# Patient Record
Sex: FEMALE | Race: WHITE
[De-identification: names, ages, dates, MRNs, and addresses within clinical notes are randomized per-mention and may not be internally consistent; named-entity substitution may affect disease eponyms.]

---

## 2017-08-31 ENCOUNTER — HOSPITAL ENCOUNTER (EMERGENCY)
Dept: HOSPITAL 25 - ED | Age: 22
Discharge: HOME | End: 2017-08-31
Payer: COMMERCIAL

## 2017-08-31 ENCOUNTER — HOSPITAL ENCOUNTER (EMERGENCY)
Dept: HOSPITAL 25 - UCEAST | Age: 22
Discharge: HOME | End: 2017-08-31
Payer: COMMERCIAL

## 2017-08-31 VITALS — SYSTOLIC BLOOD PRESSURE: 99 MMHG | DIASTOLIC BLOOD PRESSURE: 63 MMHG

## 2017-08-31 VITALS — SYSTOLIC BLOOD PRESSURE: 98 MMHG | DIASTOLIC BLOOD PRESSURE: 54 MMHG

## 2017-08-31 DIAGNOSIS — F99: Primary | ICD-10-CM

## 2017-08-31 DIAGNOSIS — Z76.0: Primary | ICD-10-CM

## 2017-08-31 DIAGNOSIS — F17.210: ICD-10-CM

## 2017-08-31 PROCEDURE — 99282 EMERGENCY DEPT VISIT SF MDM: CPT

## 2017-08-31 PROCEDURE — G0463 HOSPITAL OUTPT CLINIC VISIT: HCPCS

## 2017-08-31 PROCEDURE — 99211 OFF/OP EST MAY X REQ PHY/QHP: CPT

## 2017-08-31 NOTE — ED
Psychiatric Complaint





- HPI Summary


HPI Summary: 





THe patient is a 22 year old female priorly under the care of [facility] 

where her medications were filled in the past.   The patient states she no 

longer follows with them  and has an appointment with [facility], however is unable to be seen by them until 9/11.   She will run out of 

her medication prior to this time and is requesting refills for 2 weeks.  SHe 

does have  a primary physician.  SHe denies HI/ SI and is not feeling depressed 

or manic.  She is traveling to [place] tomorrow.   





- History Of Current Complaint


Chief Complaint: EDPrescriptionNeeded


Time Seen by Provider: 08/31/17 20:21


Hx Obtained From: Patient


Hx Last Menstrual Period: 8/17/17


Severity Currently: None


Aggravating Factor(s): Nothing


Alleviating Factor(s): Nothing





- Allergies/Home Medications


Allergies/Adverse Reactions: 


 Allergies











Allergy/AdvReac Type Severity Reaction Status Date / Time


 


No Known Allergies Allergy   Verified 08/31/17 17:39














PMH/Surg Hx/FS Hx/Imm Hx


Previously Healthy: No - previous psych history 


Respiratory History: Reports: Hx Asthma





- Surgical History


Surgery Procedure, Year, and Place: HYMENECTOMY


Infectious Disease History: No


Infectious Disease History: 


   Denies: Traveled Outside the [place] in Last 30 Days





- Family History


Known Family History: Positive: None





- Social History


Alcohol Use: Weekly


Substance Use Type: Reports: Marijuana


Substance Use Comment - Amount & Last Used: none in a week


Smoking Status (MU): Light Every Day Tobacco Smoker


Amount Used/How Often: about 3 times a week





Review of Systems


All Other Systems Reviewed And Are Negative: Yes





Physical Exam


Triage Information Reviewed: Yes


Vital Signs On Initial Exam: 


 Initial Vitals











Temp Pulse Resp BP Pulse Ox


 


 97.7 F   61   14   104/64   100 


 


 08/31/17 19:19  08/31/17 19:19  08/31/17 19:19  08/31/17 19:19  08/31/17 19:19











Vital Signs Reviewed: Yes


Appearance: Positive: Well-Appearing, No Pain Distress, Well-Nourished


Skin: Positive: Warm, Skin Color Reflects Adequate Perfusion


Respiratory/Lung Sounds: Positive: Breath Sounds Present


Cardiovascular: Positive: Normal, RRR


Neurological: Positive: Normal, Sensory/Motor Intact, Alert, Oriented to Person 

Place, Time, CN Intact II-III, Normal Gait, Facial Symmetry, Speech Normal


Psychiatric: Positive: Normal





Diagnostics





- Vital Signs


 Vital Signs











  Temp Pulse Resp BP Pulse Ox


 


 08/31/17 21:15  97.6 F  52  16  98/54  100


 


 08/31/17 19:19  97.7 F  61  14  104/64  100














- Laboratory


Lab Statement: Any lab studies that have been ordered have been reviewed, and 

results considered in the medical decision making process.





Course/Dx





- Course


Course Of Treatment: Tx was discussed with patient due to patients travel plans 

medications will be called in for 1 week, however patient should be followed by 

primary or TC.





- Differential Dx/Clinical Impression


Provider Diagnosis: 


 Medication refill








Discharge





- Discharge Plan


Condition: Stable


Disposition: HOME


Prescriptions: 


Bupropion HCl [Wellbutrin Sr] 300 mg PO BEDTIME #7 dose


Propranolol TAB* [Inderal TAB*] 10 mg PO DAILY PRN #7 dose


 PRN Reason: Anxiety


Quetiapine Fumarate [Seroquel] 25 mg PO BEDTIME PRN #7 dose


 PRN Reason: Insomnia


Patient Education Materials:  Bupropion (By mouth), Quetiapine (By mouth), 

Propranolol (By mouth)


Referrals: 


Liz Washington MD [Primary Care Provider] - 


Additional Instructions: 


- Medication given x 7 days.  Please follow up with primary physician for 

medication refills.

## 2017-08-31 NOTE — UC
General HPI





- HPI Summary


HPI Summary: 





NEEDS REFILL OF SEROQUIL, PROPANOLOL, AND WELLBUTRIN. WAS COVERED WITH [facility], HAS BEEN DROPPED BY THEIR SERVICE. APPT WITH [facility] hospital IN SEPT. NO THOUGHTS 

OF SELF HARM OR OF HURTING OTHERS. 





- History of Current Complaint


Chief Complaint: UCMedRefill


Stated Complaint: MED REFILL


Time Seen by Provider: 08/31/17 18:08


Hx Obtained From: Patient


Hx Last Menstrual Period: 8/17/17


Onset/Duration: Still Present


Onset Severity: Mild


Current Severity: None


Pain Intensity: 0


Associated Signs & Symptoms: Negative: Agitation, Abdominal Pain, Cough, 

Dizziness, Diarrhea, Dysuria, Fever, Headache, Palpitations, Wheezing, Weakness





- Allergy/Home Medications


Allergies/Adverse Reactions: 


 Allergies











Allergy/AdvReac Type Severity Reaction Status Date / Time


 


No Known Allergies Allergy   Verified 08/31/17 17:39











Home Medications: 


 Home Medications





Bupropion HCl [Wellbutrin Sr] 300 mg PO BEDTIME 08/31/17 [History Confirmed 08/ 31/17]


Lithium Carbonate 150 mg PO BEDTIME 08/31/17 [History Confirmed 08/31/17]


Propranolol TAB* [Inderal TAB*] 10 mg PO DAILY PRN 08/31/17 [History Confirmed 

08/31/17]


Quetiapine Fumarate [Seroquel] 25 mg PO BEDTIME PRN 08/31/17 [History Confirmed 

08/31/17]











PMH/Surg Hx/FS Hx/Imm Hx


Previously Healthy: Yes





- Surgical History


Surgical History: Yes


Surgery Procedure, Year, and Place: HYMENECTOMY





- Family History


Known Family History: Positive: None





- Social History


Occupation: Student


Lives: With Family


Alcohol Use: Weekly


Substance Use Type: Marijuana


Substance Use Comment - Amount & Last Used: none in a week


Smoking Status (MU): Light Every Day Tobacco Smoker


Amount Used/How Often: about 3 times a week





Review of Systems


Constitutional: Negative


Skin: Negative


Eyes: Negative


ENT: Negative


Respiratory: Negative


Cardiovascular: Negative


Gastrointestinal: Negative


Genitourinary: Negative


Motor: Negative


Neurovascular: Negative


Musculoskeletal: Negative


Neurological: Negative


Psychological: Negative


All Other Systems Reviewed And Are Negative: Yes





Physical Exam


Triage Information Reviewed: Yes


Appearance: Well-Appearing, No Pain Distress, Well-Nourished


Vital Signs: 


 Initial Vital Signs











Temp  98.5 F   08/31/17 17:33


 


Pulse  68   08/31/17 17:33


 


Resp  16   08/31/17 17:33


 


BP  99/63   08/31/17 17:33


 


Pulse Ox  100   08/31/17 17:33











Vital Signs Reviewed: Yes


Eye Exam: Normal


ENT Exam: Normal


ENT: Positive: Normal ENT inspection, Hearing grossly normal, Pharynx normal, 

TMs normal


Dental Exam: Normal


Neck exam: Normal


Neck: Positive: Supple, Nontender, No Lymphadenopathy


Respiratory Exam: Normal


Respiratory: Positive: Chest non-tender, Lungs clear, Normal breath sounds, No 

respiratory distress, No accessory muscle use


Cardiovascular Exam: Normal


Cardiovascular: Positive: RRR, No Murmur, Pulses Normal


Abdominal Exam: Normal


Musculoskeletal Exam: Normal


Musculoskeletal: Positive: Strength Intact, ROM Intact


Neurological Exam: Normal


Psychological Exam: Normal


Skin Exam: Normal





Course/Dx





- Course


Course Of Treatment: PATIENT AGREED TO SEEK EVALUATION BY PRIMARY CARE, OR TO 

VISIT ED FOR PSYCHIATRIC CONSULTATION AND EVALUATION





- Differential Dx - Multi-Symptom


Differential Diagnoses: Metabolic Abnormality, Urinary Tract Infection


Provider Diagnoses: NORMAL EXAM;





Discharge





- Discharge Plan


Condition: Stable


Disposition: HOME


Referrals: 


Liz Washington MD [Primary Care Provider] - 


Additional Instructions: 


WE ARE UNABLE TO REFILL YOUR MEDICATIONS AT THIS TIME. PLEASE CALL YOUR PRIMARY 

CARE PROVIDER, AND/OR SEEK EVALUATION WITH THE EMERGENCY DEPARTMENT TO EVALUATE 

YOUR CONDITION AND TO RESPOND TO YOUR CONCERNS.

## 2017-09-20 NOTE — ED
Progress





- Progress Note


Progress Note: 





It appears pt was seen by me but chart is attributed to the [facility], so my 

documentation is complete





Course/Dx





- Course


Course Of Treatment: Tx was discussed with patient due to patients travel plans 

medications will be called in for 1 week, however patient should be followed by 

primary or TC.





- Diagnoses


Provider Diagnoses: 


 Medication refill

## 2019-06-22 ENCOUNTER — HOSPITAL ENCOUNTER (EMERGENCY)
Dept: HOSPITAL 25 - UCEAST | Age: 24
Discharge: HOME | End: 2019-06-22
Payer: COMMERCIAL

## 2019-06-22 VITALS — DIASTOLIC BLOOD PRESSURE: 65 MMHG | SYSTOLIC BLOOD PRESSURE: 103 MMHG

## 2019-06-22 DIAGNOSIS — K04.7: Primary | ICD-10-CM

## 2019-06-22 DIAGNOSIS — F17.210: ICD-10-CM

## 2019-06-22 PROCEDURE — 99212 OFFICE O/P EST SF 10 MIN: CPT

## 2019-06-22 PROCEDURE — G0463 HOSPITAL OUTPT CLINIC VISIT: HCPCS

## 2020-01-30 ENCOUNTER — HOSPITAL ENCOUNTER (INPATIENT)
Dept: HOSPITAL 25 - ED | Age: 25
LOS: 6 days | Discharge: HOME | DRG: 885 | End: 2020-02-05
Attending: PSYCHIATRY & NEUROLOGY | Admitting: PSYCHIATRY & NEUROLOGY
Payer: SELF-PAY

## 2020-01-30 DIAGNOSIS — F60.3: ICD-10-CM

## 2020-01-30 DIAGNOSIS — R45.851: ICD-10-CM

## 2020-01-30 DIAGNOSIS — Z79.899: ICD-10-CM

## 2020-01-30 DIAGNOSIS — Z91.19: ICD-10-CM

## 2020-01-30 DIAGNOSIS — J45.909: ICD-10-CM

## 2020-01-30 DIAGNOSIS — F41.9: ICD-10-CM

## 2020-01-30 DIAGNOSIS — F17.290: ICD-10-CM

## 2020-01-30 DIAGNOSIS — F31.60: Primary | ICD-10-CM

## 2020-01-30 LAB
ALBUMIN SERPL BCG-MCNC: 4.3 G/DL (ref 3.2–5.2)
ALBUMIN/GLOB SERPL: 1.5 {RATIO} (ref 1–3)
ALP SERPL-CCNC: 58 U/L (ref 34–104)
ALT SERPL W P-5'-P-CCNC: 33 U/L (ref 7–52)
ANION GAP SERPL CALC-SCNC: 7 MMOL/L (ref 2–11)
APAP SERPL-MCNC: < 15 MCG/ML
AST SERPL-CCNC: 31 U/L (ref 13–39)
BASOPHILS # BLD AUTO: 0.1 10^3/UL (ref 0–0.2)
BUN SERPL-MCNC: 12 MG/DL (ref 6–24)
BUN/CREAT SERPL: 17.6 (ref 8–20)
CALCIUM SERPL-MCNC: 9 MG/DL (ref 8.6–10.3)
CHLORIDE SERPL-SCNC: 104 MMOL/L (ref 101–111)
EOSINOPHIL # BLD AUTO: 0.1 10^3/UL (ref 0–0.6)
GLOBULIN SER CALC-MCNC: 2.9 G/DL (ref 2–4)
GLUCOSE SERPL-MCNC: 88 MG/DL (ref 70–100)
HCO3 SERPL-SCNC: 27 MMOL/L (ref 22–32)
HCT VFR BLD AUTO: 42 % (ref 35–47)
HGB BLD-MCNC: 14.8 G/DL (ref 12–16)
LYMPHOCYTES # BLD AUTO: 1.8 10^3/UL (ref 1–4.8)
MCH RBC QN AUTO: 33 PG (ref 27–31)
MCHC RBC AUTO-ENTMCNC: 35 G/DL (ref 31–36)
MCV RBC AUTO: 94 FL (ref 80–97)
MONOCYTES # BLD AUTO: 0.5 10^3/UL (ref 0–0.8)
NEUTROPHILS # BLD AUTO: 4.6 10^3/UL (ref 1.5–7.7)
NRBC # BLD AUTO: 0 10^3/UL
NRBC BLD QL AUTO: 0
PLATELET # BLD AUTO: 191 10^3/UL (ref 150–450)
POTASSIUM SERPL-SCNC: 3.7 MMOL/L (ref 3.5–5)
PROT SERPL-MCNC: 7.2 G/DL (ref 6.4–8.9)
RBC # BLD AUTO: 4.53 10^6 /UL (ref 3.7–4.87)
RBC UR QL AUTO: (no result)
SALICYLATES SERPL-MCNC: < 2.5 MG/DL (ref ?–30)
SODIUM SERPL-SCNC: 138 MMOL/L (ref 135–145)
TSH SERPL-ACNC: 0.81 MCIU/ML (ref 0.34–5.6)
WBC # BLD AUTO: 7.1 10^3/UL (ref 3.5–10.8)
WBC UR QL AUTO: (no result)

## 2020-01-30 PROCEDURE — 85025 COMPLETE CBC W/AUTO DIFF WBC: CPT

## 2020-01-30 PROCEDURE — 80053 COMPREHEN METABOLIC PANEL: CPT

## 2020-01-30 PROCEDURE — 99222 1ST HOSP IP/OBS MODERATE 55: CPT

## 2020-01-30 PROCEDURE — G0480 DRUG TEST DEF 1-7 CLASSES: HCPCS

## 2020-01-30 PROCEDURE — 84443 ASSAY THYROID STIM HORMONE: CPT

## 2020-01-30 PROCEDURE — 99238 HOSP IP/OBS DSCHRG MGMT 30/<: CPT

## 2020-01-30 PROCEDURE — 87086 URINE CULTURE/COLONY COUNT: CPT

## 2020-01-30 PROCEDURE — 99283 EMERGENCY DEPT VISIT LOW MDM: CPT

## 2020-01-30 PROCEDURE — 81003 URINALYSIS AUTO W/O SCOPE: CPT

## 2020-01-30 PROCEDURE — 83036 HEMOGLOBIN GLYCOSYLATED A1C: CPT

## 2020-01-30 PROCEDURE — 80307 DRUG TEST PRSMV CHEM ANLYZR: CPT

## 2020-01-30 PROCEDURE — 81015 MICROSCOPIC EXAM OF URINE: CPT

## 2020-01-30 PROCEDURE — 80320 DRUG SCREEN QUANTALCOHOLS: CPT

## 2020-01-30 PROCEDURE — 99232 SBSQ HOSP IP/OBS MODERATE 35: CPT

## 2020-01-30 PROCEDURE — 36415 COLL VENOUS BLD VENIPUNCTURE: CPT

## 2020-01-30 PROCEDURE — 90853 GROUP PSYCHOTHERAPY: CPT

## 2020-01-30 PROCEDURE — 80329 ANALGESICS NON-OPIOID 1 OR 2: CPT

## 2020-01-30 PROCEDURE — 84702 CHORIONIC GONADOTROPIN TEST: CPT

## 2020-01-30 PROCEDURE — 80061 LIPID PANEL: CPT

## 2020-01-31 LAB
CHOLEST SERPL-MCNC: 124 MG/DL
HDLC SERPL-MCNC: 28.8 MG/DL
TRIGL SERPL-MCNC: 74 MG/DL

## 2020-01-31 RX ADMIN — THERA TABS SCH: TAB at 08:53

## 2020-01-31 RX ADMIN — ARIPIPRAZOLE SCH MG: 5 TABLET ORAL at 21:12
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
## 2020-02-01 LAB — HCG SERPL QL: < 0.6 MIU/ML

## 2020-02-01 RX ADMIN — ARIPIPRAZOLE SCH MG: 5 TABLET ORAL at 21:52

## 2020-02-01 RX ADMIN — THERA TABS SCH: TAB at 12:25

## 2020-02-02 RX ADMIN — THERA TABS SCH: TAB at 07:55

## 2020-02-02 RX ADMIN — ARIPIPRAZOLE SCH MG: 5 TABLET ORAL at 21:12

## 2020-02-02 NOTE — PN
Subjective





- Subjective


Date of Service: 02/02/20


Service Type: 11295 Hosp care 25 min moderate complexity


Subjective: 





Amira appears to be calm, pleasant and cooperative. She understand the 

reasons for her relapse on symptoms and is willing to take meds again. She also 

requested to increase the dose of her meds so that she can go on long acting 

injectable before she leaves. Denies racing thoughts and SI. Says she slept 

well last night.





Objective





- General Observations


Appearance: Neat, Well Groomed


Appears Stated Age: Yes


Stature: Thin, Tall


Posture: WNL


Eye Contact: Average


Behavior/Activity: WNL





- Interaction Observations


Attitude Towards Examiner: Cooperative


Stated Mood: Euthymic


Affect: Full


Speech Pattern/Tone: Clear, Appropriate, Normal Volume


Perception: WNL


Thought Content: WNL


Hallucination Type: Denies


Delusion Type: Denies





- Cognitive Function


Orientation: A&O x 4


Level of Consciousness: Awake, Alert, Appropriate


Cognition: WNL


Estimated Intelligence: Normal


Insight: WNL


Judgment Within Normal Limits: Yes





- Medication Compliance


Cooperative with Inpatient Medication Regimen: Yes





- Group Participation


Participates in Group Activities: Yes





Assessment





- Assessment


Merits Inpatient Hospitalization: For Stabilization, Consolidate Improvements, 

For Discharge Planning





Plan





- Plan


Treatment Plan: 


Name: AMIRA KAPLAN                        


YOB: 1995                        


[id number]


[id number]











Continued Medication Management: Continue Outpt Medication


Medications: 


 Current Medications





Acetaminophen (Tylenol Tab*)  650 mg PO Q4H PRN


   PRN Reason: PAIN or TEMP > 101 F


Al Hydrox/Mg Hydrox/Simethicone (Maalox Plus*)  30 ml PO Q4H PRN


   PRN Reason: INDIGESTION


Aripiprazole (Abilify  Tab*)  10 mg PO BEDTIME BARBARA


Hydroxyzine HCl (Atarax Tab*)  50 mg PO Q6H PRN


   PRN Reason: .ANXIETY


Multivitamins (Theragran Tab*)  1 tab PO DAILY BARBARA


   Last Admin: 02/02/20 07:55 Dose:  Not Given











- Discharge Plan


Discharge Plan: Outpatient Follow Up


Outpatient Program: [facility]

## 2020-02-03 RX ADMIN — ACETAMINOPHEN PRN MG: 325 TABLET ORAL at 17:13

## 2020-02-03 RX ADMIN — ACETAMINOPHEN PRN MG: 325 TABLET ORAL at 10:41

## 2020-02-03 RX ADMIN — THERA TABS SCH: TAB at 08:25

## 2020-02-03 RX ADMIN — ACETAMINOPHEN PRN MG: 325 TABLET ORAL at 22:17

## 2020-02-03 RX ADMIN — ARIPIPRAZOLE SCH MG: 5 TABLET ORAL at 20:05

## 2020-02-03 NOTE — PN
Subjective





- Subjective


Date of Service: 02/03/20


Service Type: 52850 Hosp care 25 min moderate complexity


Subjective: 


Patient presents with elevated energy, as well as tangential topics and rapid 

speech.  She reports that her thoughts are slowed down and that the environment 

is not stimulating.  She reports not going to groups as they have not been 

helpful in the past.  She states she wants to leave the hospital but does not 

have specific plans.  Patient inquires about pregnancy test and informed of 

negative HcG in ED.  She states this was approx one week after unprotected sex.

  She declines to repeat test. 





Objective





- General Observations


Appearance: Well Groomed


Stature: WNL


Posture: Tense


Eye Contact: Intermittent


Behavior/Activity: Accelerated





- Interaction Observations


Attitude Towards Examiner: Cooperative, Ingratiating


Stated Mood: Elevated


Affect: Bright


Speech Pattern/Tone: Normal Volume, Pressured


Thought Process: Tangential, Racing


Perception: WNL


Thought Content: Grandiose


Hallucination Type: Denies


Delusion Type: Grandeur





- Cognitive Function


Orientation: A&O x 4


Level of Consciousness: Alert


Cognition: Impaired Attention/Concentration


Estimated Intelligence: Normal


Insight: Difficulty Acknowledging Presence of Psyciatric Problems


Judgment Within Normal Limits: No


Ability to Make Reasonable Decisions: Moderately Impaired





- Medication Compliance


Cooperative with Inpatient Medication Regimen: Yes





- Group Participation


Participates in Group Activities: No





Assessment





- Assessment


Merits Inpatient Hospitalization: For Immediate Safety, For Stabilization


Inpatient DSM-V Dx: F31.63


Clinical Impression: 


23yo wf with history of bipolar I disorder and poor compliance to outpatient 

treatment.  She presented to ED by a friend when she expressed racing suicidal 

thoughts.  She merits hospitalization for immediate safety and stabilization. 





Plan





- Plan


Treatment Plan: 


Name: AMIRA KAPLAN                        


YOB: 1995                        


[id number]


[id number]








continue acute intensive psychiatric treatment. may decrease to q30min and 

allow staff pass.


continue current medications, consider Abilify RAUSCH.


discharge to include outpatient MH referrals.  


Continued Medication Management: Consider Medication


Medications: 


 Current Medications





Acetaminophen (Tylenol Tab*)  650 mg PO Q4H PRN


   PRN Reason: PAIN or TEMP > 101 F


   Last Admin: 02/03/20 10:41 Dose:  650 mg


Al Hydrox/Mg Hydrox/Simethicone (Maalox Plus*)  30 ml PO Q4H PRN


   PRN Reason: INDIGESTION


Aripiprazole (Abilify  Tab*)  10 mg PO BEDTIME BARBARA


   Last Admin: 02/02/20 21:12 Dose:  10 mg


Hydroxyzine HCl (Atarax Tab*)  50 mg PO Q6H PRN


   PRN Reason: .ANXIETY


   Last Admin: 02/02/20 21:16 Dose:  50 mg


Multivitamins (Theragran Tab*)  1 tab PO DAILY [facility]


   Last Admin: 02/03/20 08:25 Dose:  Not Given











- Discharge Plan


Discharge Plan: Inpatient Hospitalization

## 2020-02-04 PROCEDURE — GZHZZZZ GROUP PSYCHOTHERAPY: ICD-10-PCS

## 2020-02-04 RX ADMIN — ACETAMINOPHEN PRN MG: 325 TABLET ORAL at 07:43

## 2020-02-04 RX ADMIN — ARIPIPRAZOLE SCH MG: 5 TABLET ORAL at 20:55

## 2020-02-04 RX ADMIN — THERA TABS SCH TAB: TAB at 08:52

## 2020-02-04 RX ADMIN — ACETAMINOPHEN PRN MG: 325 TABLET ORAL at 16:19

## 2020-02-04 NOTE — PN
BSU: Group Therapy Note





- Service Type


Service Type: 23882 Group Psychotherapy - Cognitive Behavioral Group Therapy (

CBT):Patient was attentive and participatory in CBT programming this morning, 

and remained in good behavioral control.  Patient expressed positive insights 

regarding relevant treatment interventions and goals.

## 2020-02-04 NOTE — PN
Subjective





- Subjective


Date of Service: 02/04/20


Service Type: 14629 Hosp care 25 min moderate complexity


Subjective: 





Amira is eager to leave the unit. She finds it boring and feels like she has 

more pressing things to accomplish outside the unit. Her mood is elevated, 

although less so than at admission. She no longer endorses suicidal thoughts 

but does acknowledge that her tendency to having shifting moods is problematic. 

She wanted to use the injectable aripiprazole, which was administered (675 mg 

of Initio and 441 mg of Aristada). She was also agreeable to Depakote ER 1000 

mg at bedtime. Despite her assertion that she has a hard time remembering to 

take medications orally, she was interested. She was put off by the possibility 

of losing hair, but agreed to try Depakote for a month. She is willing to 

follow up at [facility] clinic.





Objective





- General Observations


Appearance: Neat, Well Groomed


Appears Stated Age: Yes


Stature: WNL


Posture: WNL


Eye Contact: Average


Behavior/Activity: Accelerated





- Interaction Observations


Attitude Towards Examiner: Cooperative, Anxious


Stated Mood: Elevated, Euthymic


Affect: Full, Bright


Speech Pattern/Tone: Clear, Appropriate, Normal Volume


Thought Process: Coherent


Perception: WNL


Thought Content: WNL


Hallucination Type: None


Delusion Type: None





- Cognitive Function


Orientation: A&O x 4


Level of Consciousness: Awake, Alert, Appropriate


Cognition: Impaired Attention/Concentration


Estimated Intelligence: Normal


Insight: WNL, Difficulty Acknowledging Presence of Psyciatric Problems


Judgment Within Normal Limits: No


Ability to Make Reasonable Decisions: Mildly Impaired





- Medication Compliance


Cooperative with Inpatient Medication Regimen: Yes





- Group Participation


Participates in Group Activities: Yes





Assessment





- Assessment


Merits Inpatient Hospitalization: For Immediate Safety


Inpatient DSM-V Dx: F31.63


Clinical Impression: 


25yo wf with history of bipolar I disorder and poor compliance to outpatient 

treatment.  She presented to ED by a friend when she expressed racing suicidal 

thoughts.  She merits hospitalization for immediate safety and stabilization. 





BSU: Problem List





- Patient Problems


(1) Bipolar 1 disorder


Current Visit: Yes   Status: Acute   Code(s): F31.9 - BIPOLAR DISORDER, 

UNSPECIFIED   SNOMED Code(s): 261097311


   





Plan





- Plan


Treatment Plan: 


Name: AMIRA KAPLAN                        


YOB: 1995                        


[id number]


[id number]








continue acute intensive psychiatric treatment. may decrease to q30min and 

allow staff pass.


continue current medications, follow up on aripiprazole injection reactions


discharge to include outpatient MH referrals at [facility] include [facility] beds at the [facility]


Continued Medication Management: Different Medication


Medications: 


 Current Medications





Acetaminophen (Tylenol Tab*)  650 mg PO Q4H PRN


   PRN Reason: PAIN or TEMP > 101 F


   Last Admin: 02/04/20 07:43 Dose:  650 mg


Al Hydrox/Mg Hydrox/Simethicone (Maalox Plus*)  30 ml PO Q4H PRN


   PRN Reason: INDIGESTION


   Last Admin: 02/03/20 22:19 Dose:  30 ml


Aripiprazole (Abilify  Tab*)  10 mg PO BEDTIME BARBARA


   Last Admin: 02/03/20 20:05 Dose:  10 mg


Hydroxyzine HCl (Atarax Tab*)  50 mg PO Q6H PRN


   PRN Reason: .ANXIETY


   Last Admin: 02/02/20 21:16 Dose:  50 mg


Multivitamins (Theragran Tab*)  1 tab PO DAILY BARBARA


   Last Admin: 02/04/20 08:52 Dose:  1 tab











- Discharge Plan


Discharge Plan: Outpatient Follow Up


[facility]patient [facility]

## 2020-02-05 VITALS — SYSTOLIC BLOOD PRESSURE: 122 MMHG | DIASTOLIC BLOOD PRESSURE: 68 MMHG

## 2020-02-05 RX ADMIN — THERA TABS SCH TAB: TAB at 09:20

## 2020-02-06 NOTE — DS
DISCHARGE SUMMARY:

 

DATE OF ADMISSION:  01/30/20

 

DATE OF DISCHARGE:  02/05/20

 

PROVIDER:  Jenn Kate NP in Psychiatry.

 

SUPERVISING PHYSICIAN:  Dr. Larry Mix.* (DICTATED BY JENN KATE NP)

 

DIAGNOSES:

1.  Bipolar 1 disorder.

2.  Borderline personality disorder.

 

CONDITION AT THE TIME OF DISCHARGE:  Improved.  Psychiatrically cleared, 
stable. Susan participated in groups and was social with peers.  Her family 
is agreeable to discharge as is Susan.  She has done well here 
psychiatrically.  She tolerated the addition of Abilify well.  She is scheduled 
to attend [facility].  She did agree to take a long 
acting injectable.  She took Aristada Initio 675 as well as Aristada 441.  
These injections were given on 02/04/20.  She will be due for her next 
injection on 03/03/20.

 

MENTAL STATUS EXAM:  At the time of discharge, Susan is calm, cooperative, 
and makes good eye contact.  She is alert and oriented x4.  Her grooming is 
good.  Her speech pace is slightly rapid.  Her thought processes are logical.  
She is not psychotic or delusional.  She denies AH, VH, SI, and HI.  Her 
insight and judgment are fair.  She is willing to follow up and urged to see a 
therapist.

 

DISCHARGE INSTRUCTIONS TO THE PATIENT: 

A.  Medications:  10 mg of Abilify for 2 days, 10 mg per day.  As mentioned 
before, Aristada Initio was administered on 02/04/20 and Aristada 441 the 
equivalent of 10 mg of Abilify per day was also administered on 02/04/20.

 

B.  Diet is regular.

 

C.  Activities as tolerated.  She is a nonsmoker.  There are no studies pending 
at the time of discharge.

 

D.  Followup care:  She has an appointment at [facility] on 
02/07/20 at 10 a.m. with Lisseth Leung, she is encouraged to come half an hour 
early to fill out paperwork.  She also has an appointment with Dr. Liz Washington 
at 1:30 on 02/10/20.

 

E.  Disposition:  She will be discharged to her apartment.  Her parents are 
driving her there.

 

F.  Substance abuse followup is not currently indicated.

 

HOSPITAL COURSE: Part A:  Chief complaint:  "My friends call me the queen of 
masking, they say I am manically depressed."

 

Susan is a 24-year-old single white female with a previous diagnosis of 
bipolar disorder and borderline personality disorder.

 

Susan was brought to the ED by her friend, Rossy who she is known as an 
advisor and friend since childhood.  Susan states she reached out to Rossy 
because she knew she was not doing well.  When Rossy saw her, Rossy decided that 
she needed to go to the inpatient psychiatric unit.  Susan reports she has 
been feeling increasingly suicidal, stating she feels "stagnant and stuck like 
I am losing my ."  She reports having "racing suicidal thoughts all the 
time."

 

About 3 weeks ago, Susan tried cocaine, stating she has been offered it before
, but had never tried it.  However, according to the HPI from her admission in 
February 2019, she reported using cocaine "once a week or once every other week.
" She reports that about 2 weeks ago, she spent 700 dollars which was all the 
money she had on "all the things I didn't need, all the good stuff."  She 
reports that recently, she has been partying more and has increased her alcohol 
and marijuana consumption as a way to help her sleep.  She reports difficulty 
focusing, stating she cannot "follow through on goals" and that her mind is 
"never not going."

 

She complains of symptoms of ADHD, stating she cannot follow through on anything
, cannot focus on other things and has racing thoughts.  She states her dad and 
brother have ADHD and that she has had testing by Dr. Rosendo Duenas who referred 
her to get treatment for the anxiety and depression she scored for before 
completing her diagnosis.

 

Susan states that her friends refer to her as "the bennett of masking."  
Stating they described her mood as being manically depressive.  She is not 
currently in therapy, not currently taking medications, and has never taken 
medication consistently.  Susan is indiscrete, has many grandiose beliefs 
such as she must be the best or she is absolutely the worst.  She has rapid 
thoughts.  She speaks rapidly.

 

Part B:  Psychiatric treatment was rendered:  Susan was admitted to the adult 
behavioral unit and placed on 15-minute checks for safety.  She did advance to 
30- minute checks and staff pass privileges.  Susan did well on the unit and 
went to groups, although she stated she found them boring and unhelpful.  She 
interacted with peers well.  Susan was eager to leave and wanted to take 
medications as soon as possible, so that she could leave as soon as possible.  
We did start her on aripiprazole lauroxil and we started her on Initio 675 mg 
and aripiprazole lauroxil 441 mg.  This is approximately equivalent to 10 mg of 
oral Abilify medication.  We did want to start her on Depakote, but time did 
not allow that as by the time we were ready to begin, she was going to be 
discharged the next day.  I encouraged her to follow up with her outpatient 
providers to discover whether Abilify continued to work well for her and if a 
mood stabilizer would be helpful in addition.  Her main objection to Depakote 
was that hair loss is possible.  Other than that, she was willing to try.

 

Because she is on an atypical antipsychotic, we should note that her baseline 
hemoglobin A1c is 5.1, triglycerides are 74, cholesterol is 124, LDL 
cholesterol is 80, HDL cholesterol 28.8 incidentally.  Her TSH is 0.81.  
Although, Susan stated that she had been using marijuana to help her sleep, 
her cannabinoid screen came back as none detected.

 

I did meet with her mom, Marie, and her father.  They had a lot of questions 
regarding the disease state of bipolar disorder and also specific questions 
about Susan herself.  They found some of her current behaviors quite 
irritating and discussed how she could be perceived either as lazy or anxious.  
They were encouraged to consider ways to help her with her anxiety that is 
getting in the way of her doing tasks that she is required to do.  She did meet 
with Imtiaz Aguilar who is our , indicated it was a bit of an odd 
meeting because she requested the visit and when Imtiaz asked her why, she 
said "nothing in particular, I just thought it would be good to talk with the 
."  It leads to a demonstration of Susan's scattered thought 
processes at the time and her desire to be very social.

 

At this time, Susan is improved.  She is less talkative.  She still speaks at 
a rapid pace.  She shows more insight into her disorder.  She seems to be 
curious about ways to avoid having to come into the hospital again and that is 
one of the greatest benefit she can see to taking her medication appropriately.

 

____________________________________ JENN KATE, NP

 

[id number]/[facility] #: [id number]

[facility]